# Patient Record
Sex: FEMALE | NOT HISPANIC OR LATINO | URBAN - METROPOLITAN AREA
[De-identification: names, ages, dates, MRNs, and addresses within clinical notes are randomized per-mention and may not be internally consistent; named-entity substitution may affect disease eponyms.]

---

## 2017-11-21 ENCOUNTER — IMPORTED ENCOUNTER (OUTPATIENT)
Dept: URBAN - METROPOLITAN AREA CLINIC 1 | Facility: CLINIC | Age: 77
End: 2017-11-21

## 2017-11-21 PROBLEM — H26.493: Noted: 2017-11-21

## 2017-11-21 PROBLEM — H04.123: Noted: 2017-11-21

## 2017-11-21 PROBLEM — H16.143: Noted: 2017-11-21

## 2017-11-21 PROBLEM — Z96.1: Noted: 2017-11-21

## 2017-11-21 PROCEDURE — 92014 COMPRE OPH EXAM EST PT 1/>: CPT

## 2017-11-21 NOTE — PATIENT DISCUSSION
1.  LULY w/ PEK OU-The use/continuation of artificial tears were recommended. 2.  PCO OU: (Posterior Capsule Opacification)   Observe and consider yag cap when pt feels pco visually significant and visual acuity decreases to appropriate level. 3. Pseudophakia OU - (TMF OD- ZLBOO TMF OS- ZKBOO) H/o LenSx OUPatient defers the refraction at today's visitReturn for an appointment in 1 year 30/glare with Dr. Janeth Geiger.

## 2018-11-26 ENCOUNTER — IMPORTED ENCOUNTER (OUTPATIENT)
Dept: URBAN - METROPOLITAN AREA CLINIC 1 | Facility: CLINIC | Age: 78
End: 2018-11-26

## 2018-11-26 PROBLEM — Z96.1: Noted: 2018-11-26

## 2018-11-26 PROBLEM — H26.493: Noted: 2018-11-26

## 2018-11-26 PROBLEM — H16.143: Noted: 2018-11-26

## 2018-11-26 PROBLEM — H04.123: Noted: 2018-11-26

## 2018-11-26 PROCEDURE — 92014 COMPRE OPH EXAM EST PT 1/>: CPT

## 2018-11-26 NOTE — PATIENT DISCUSSION
1.  PCO OU: (Posterior Capsule Opacification)   Observe and consider yag cap when pt feels pco visually significant and visual acuity decreases to appropriate level. 2. LULY w/ PEK OU - The use/continuation of artificial tears were recommended. 3.  Pseudophakia OU - (TMF OD- ZLBOO TMF OS- ZKBOO) H/o LenSx OUReturn for an appointment in 1 year for 30 / glare with Dr. Nathaly Mcguire.

## 2019-11-25 ENCOUNTER — IMPORTED ENCOUNTER (OUTPATIENT)
Dept: URBAN - METROPOLITAN AREA CLINIC 1 | Facility: CLINIC | Age: 79
End: 2019-11-25

## 2019-11-25 PROBLEM — H04.123: Noted: 2019-11-25

## 2019-11-25 PROBLEM — Z96.1: Noted: 2019-11-25

## 2019-11-25 PROBLEM — H16.143: Noted: 2019-11-25

## 2019-11-25 PROBLEM — H26.493: Noted: 2019-11-25

## 2019-11-25 PROCEDURE — 92014 COMPRE OPH EXAM EST PT 1/>: CPT

## 2019-11-25 PROCEDURE — 92015 DETERMINE REFRACTIVE STATE: CPT

## 2019-11-25 NOTE — PATIENT DISCUSSION
1.  PCO OU- Visually Significant *secondary to glare*; schedule YAG Cap. Pros cons risks and benefits. 2.  LULY w/ PEK OU - Recommend the use of ATs TID-QID OU routinely. 3. Pseudophakia OU - (TMF OD- ZLBOO TMF OS- ZKBOO) H/o LenSx OUReturn for an appointment in YAG Cap OS then OD with Dr. Bobo Cali.

## 2019-12-06 ENCOUNTER — IMPORTED ENCOUNTER (OUTPATIENT)
Dept: URBAN - METROPOLITAN AREA CLINIC 1 | Facility: CLINIC | Age: 79
End: 2019-12-06

## 2019-12-06 PROBLEM — H26.492: Noted: 2019-12-06

## 2019-12-06 PROCEDURE — 66821 AFTER CATARACT LASER SURGERY: CPT

## 2019-12-20 ENCOUNTER — IMPORTED ENCOUNTER (OUTPATIENT)
Dept: URBAN - METROPOLITAN AREA CLINIC 1 | Facility: CLINIC | Age: 79
End: 2019-12-20

## 2019-12-20 PROBLEM — H26.491: Noted: 2019-12-20

## 2019-12-20 PROCEDURE — 66821 AFTER CATARACT LASER SURGERY: CPT

## 2019-12-20 NOTE — PATIENT DISCUSSION
YAG CAP OD: (Consent signed and scanned into attachments) 1 gtt Prolensa applied. The purpose and nature of the procedure possible alternative methods of treatment the risks involved and the possibility of complications were discussed with patient. The Patient wishes to proceed and the consent was signed. The laser was then performed under topical anesthesia with no complications. Post op instructions were given to patient as well as a follow-up appointment. Patient was advised to call our office if any questions or concerns. Return for an appointment in 1-4 weeks po/yag with Dr. Tyler Morgan.

## 2020-01-28 ENCOUNTER — IMPORTED ENCOUNTER (OUTPATIENT)
Dept: URBAN - METROPOLITAN AREA CLINIC 1 | Facility: CLINIC | Age: 80
End: 2020-01-28

## 2020-01-28 PROCEDURE — 99024 POSTOP FOLLOW-UP VISIT: CPT

## 2020-01-28 NOTE — PATIENT DISCUSSION
PO YAG Cap OU: good result. MRX given. Return for an appointment in November 30 with Dr. Kyara Aguirre.

## 2020-11-10 ENCOUNTER — IMPORTED ENCOUNTER (OUTPATIENT)
Dept: URBAN - METROPOLITAN AREA CLINIC 1 | Facility: CLINIC | Age: 80
End: 2020-11-10

## 2020-11-10 PROBLEM — H04.123: Noted: 2020-11-10

## 2020-11-10 PROBLEM — Z96.1: Noted: 2020-11-10

## 2020-11-10 PROBLEM — H16.143: Noted: 2020-11-10

## 2020-11-10 PROCEDURE — 92014 COMPRE OPH EXAM EST PT 1/>: CPT

## 2020-11-10 NOTE — PATIENT DISCUSSION
1.  LULY w/ PEK OU - Recommend ATs TID OU routinely. 2. Pseudophakia OU - (TMF OD- ZLBOO TMF OS- ZKBOO) H/o LenSx OU. H/o Yag Cap OU Patient deferred [facility] Rx today. Return for an appointment in 1 year 27 with Dr. Nathaly Mcguire.

## 2022-04-02 ASSESSMENT — VISUAL ACUITY
OD_CC: 20/30
OS_CC: 20/25
OS_SC: J1
OS_GLARE: 20/60
OD_CC: 20/30+2
OD_SC: J1
OD_CC: 20/30-2
OD_GLARE: 20/200
OD_SC: 20/20
OD_GLARE: 20/60
OD_SC: J1
OS_CC: 20/25
OS_CC: 20/30
OS_CC: 20/25+1
OD_CC: 20/30
OS_SC: J1
OD_SC: J1
OS_GLARE: 20/200
OS_SC: J1
OS_CC: 20/30+2
OD_CC: 20/40
OS_SC: 20/20

## 2022-04-02 ASSESSMENT — TONOMETRY
OD_IOP_MMHG: 17
OD_IOP_MMHG: 18
OS_IOP_MMHG: 16
OS_IOP_MMHG: 17
OD_IOP_MMHG: 16
OD_IOP_MMHG: 17
OS_IOP_MMHG: 17
OD_IOP_MMHG: 16
OS_IOP_MMHG: 17
OS_IOP_MMHG: 17